# Patient Record
(demographics unavailable — no encounter records)

---

## 2025-06-09 NOTE — REVIEW OF SYSTEMS
[Ear Pain] : ear pain [Ear Noises] : ear noises [Nasal Congestion] : nasal congestion [Sinus Pain] : sinus pain [Sinus Pressure] : sinus pressure [Negative] : Nasal [Patient Intake Form Reviewed] : Patient intake form was reviewed [Ear Drainage] : no ear drainage [Discolored Nasal Discharge] : no discolored nasal discharge [de-identified] : left ear pressure , muffled hearing  [de-identified] : pt went to [facility] on amox and prednisone don't feel better , cough

## 2025-06-09 NOTE — DATA REVIEWED
[de-identified] : Type C tymps, AU. Testing via inserts: AD- WNL. AS- Normal, w/ conductive overlay at 1kHz, sloping to a mild to moderate SNHL thereafter. Recs: 1) F/u w/ MD 2) Re-eval post tx

## 2025-06-09 NOTE — HISTORY OF PRESENT ILLNESS
[de-identified] : left ear pain  and uri onset 5 d ago  to urgent care rx amox followed by Medrol neg [facility] re ears

## 2025-06-09 NOTE — REASON FOR VISIT
[Initial Consultation] : an initial consultation for [Ear Pain] : ear pain [Sinusitis] : sinusitis [FreeTextEntry2] : ear

## 2025-06-09 NOTE — ASSESSMENT
[FreeTextEntry1] : left tm dull c tymp au as mixed cnd loss persistent otalgia change to cefdinir 300 pred 10 #18 f/u 2 weeks

## 2025-06-09 NOTE — PHYSICAL EXAM
[Midline] : trachea located in midline position [Normal] : orientation to person, place, and time: normal [de-identified] : left tm dulo

## 2025-06-19 NOTE — REVIEW OF SYSTEMS
[Ear Pain] : ear pain [Dizziness] : dizziness [Ear Noises] : ear noises [Negative] : Heme/Lymph [Ear Itch] : no ear itch [Hearing Loss] : no hearing loss [Ear Drainage] : no ear drainage [de-identified] : pt is on prednisone,  pt stated  need med refill

## 2025-06-19 NOTE — REASON FOR VISIT
[Subsequent Evaluation] : a subsequent evaluation for [Ear Pain] : ear pain [Tinnitus] : tinnitus [FreeTextEntry2] : balance issues

## 2025-07-08 NOTE — REVIEW OF SYSTEMS
[Negative] : Ear [Patient Intake Form Reviewed] : Patient intake form was reviewed [de-identified] : pt states Sudafed improved her symptoms, currently out of prednisone, bilateral clogging

## 2025-07-08 NOTE — HISTORY OF PRESENT ILLNESS
[de-identified] : f/u as eust tube dys improved after pred rx but still not right did use sudafed w good results but raises bp

## 2025-07-08 NOTE — ASSESSMENT
[FreeTextEntry1] : exam unremarkable eust tube dys better rec claritin do not rec m and t at this time